# Patient Record
Sex: FEMALE | Race: WHITE | URBAN - METROPOLITAN AREA
[De-identification: names, ages, dates, MRNs, and addresses within clinical notes are randomized per-mention and may not be internally consistent; named-entity substitution may affect disease eponyms.]

---

## 2017-03-23 PROBLEM — M05.79 RHEUMATOID ARTHRITIS INVOLVING MULTIPLE SITES WITH POSITIVE RHEUMATOID FACTOR (HCC): Status: ACTIVE | Noted: 2017-03-23

## 2020-10-20 PROBLEM — Z53.20 COLONOSCOPY REFUSED: Status: ACTIVE | Noted: 2020-10-20

## 2020-10-20 PROBLEM — Z53.20 MAMMOGRAM DECLINED: Status: ACTIVE | Noted: 2020-10-20

## 2022-03-19 PROBLEM — M05.79 RHEUMATOID ARTHRITIS INVOLVING MULTIPLE SITES WITH POSITIVE RHEUMATOID FACTOR (HCC): Status: ACTIVE | Noted: 2017-03-23

## 2022-07-26 ENCOUNTER — HOSPITAL ENCOUNTER (OUTPATIENT)
Dept: CT IMAGING | Age: 72
Discharge: HOME | End: 2022-07-26
Attending: FAMILY MEDICINE
Payer: MEDICARE

## 2022-07-26 DIAGNOSIS — R20.2 PARESTHESIA: ICD-10-CM

## 2022-07-26 PROCEDURE — 70450 CT HEAD/BRAIN W/O DYE: CPT

## 2022-12-12 ENCOUNTER — OFFICE VISIT (OUTPATIENT)
Dept: NEUROLOGY | Age: 72
End: 2022-12-12
Payer: MEDICARE

## 2022-12-12 VITALS
WEIGHT: 112.1 LBS | RESPIRATION RATE: 16 BRPM | SYSTOLIC BLOOD PRESSURE: 146 MMHG | HEART RATE: 76 BPM | HEIGHT: 62 IN | OXYGEN SATURATION: 97 % | DIASTOLIC BLOOD PRESSURE: 70 MMHG | BODY MASS INDEX: 20.63 KG/M2

## 2022-12-12 DIAGNOSIS — R20.2 POSITIVE TINEL'S SIGN: ICD-10-CM

## 2022-12-12 DIAGNOSIS — R20.2 NUMBNESS AND TINGLING IN BOTH HANDS: ICD-10-CM

## 2022-12-12 DIAGNOSIS — R20.0 NUMBNESS AND TINGLING IN BOTH HANDS: ICD-10-CM

## 2022-12-12 DIAGNOSIS — R20.2 TINGLING OF BOTH FEET: Primary | ICD-10-CM

## 2022-12-12 PROCEDURE — G9899 SCRN MAM PERF RSLTS DOC: HCPCS | Performed by: PSYCHIATRY & NEUROLOGY

## 2022-12-12 PROCEDURE — 3017F COLORECTAL CA SCREEN DOC REV: CPT | Performed by: PSYCHIATRY & NEUROLOGY

## 2022-12-12 PROCEDURE — 1101F PT FALLS ASSESS-DOCD LE1/YR: CPT | Performed by: PSYCHIATRY & NEUROLOGY

## 2022-12-12 PROCEDURE — G8399 PT W/DXA RESULTS DOCUMENT: HCPCS | Performed by: PSYCHIATRY & NEUROLOGY

## 2022-12-12 PROCEDURE — 1123F ACP DISCUSS/DSCN MKR DOCD: CPT | Performed by: PSYCHIATRY & NEUROLOGY

## 2022-12-12 PROCEDURE — 1090F PRES/ABSN URINE INCON ASSESS: CPT | Performed by: PSYCHIATRY & NEUROLOGY

## 2022-12-12 PROCEDURE — 99204 OFFICE O/P NEW MOD 45 MIN: CPT | Performed by: PSYCHIATRY & NEUROLOGY

## 2022-12-12 PROCEDURE — G8427 DOCREV CUR MEDS BY ELIG CLIN: HCPCS | Performed by: PSYCHIATRY & NEUROLOGY

## 2022-12-12 PROCEDURE — G8510 SCR DEP NEG, NO PLAN REQD: HCPCS | Performed by: PSYCHIATRY & NEUROLOGY

## 2022-12-12 PROCEDURE — G8536 NO DOC ELDER MAL SCRN: HCPCS | Performed by: PSYCHIATRY & NEUROLOGY

## 2022-12-12 PROCEDURE — G8420 CALC BMI NORM PARAMETERS: HCPCS | Performed by: PSYCHIATRY & NEUROLOGY

## 2022-12-12 NOTE — PROGRESS NOTES
[facility] and [address] at [facility] Neurology Clinics at [facility] [address], [address] [facility] 7687 [address] [facility], [address] [place] [address]  [phone] Office  [phone]           Referring: Frank Chan MD  [address] [facility],  [id number]     Chief Complaint   Patient presents with    New Patient     Paresthesias intermittently in hands and feet. States they have gotten better but wanted to keep appt in case sensations came back     61-year-old lady who since today for initial neurologic consultation regarding dysesthesias in her hands and her feet. She first noticed this around February of this year and it continued through the spring and she saw her primary physician in July. Symptoms would come and go and she was starting to have an increase incidence of such so she had a CT of her head performed that was unremarkable. I did review that CT scan personally dated 7/26/2022 and to my interpretation I concur is normal.  In any regard she describes in her hands a tingling type sensation. No pins-and-needles. She feels like she may have Aubagio going on. She says that her hand and fingers felt swollen. All fingers involved. No weakness. Not dropping things. No difficulty opening bottles or cans. In her feet she notes that her toes with tingle and feel fat. She would sometimes feel like she had a drop of water on her leg and she would look to see if something was leaking or dripping on her and it was not. She also in the left gastroc will sometimes have the sensation that something is moving around in there. She is describing fasciculation. She has not had chemotherapy. No toxic exposures. She does not have diabetes. She believes her father had peripheral neuropathy and she believes it was likely idiopathic as he did not have diabetes. She has not had an EMG.     Rheumatology notes were forwarded kindly and are reviewed today. Office visit October 12, 2022 where the patient was following up. She was complaining of a 6 months of buzzing in her hands and feet. No numbness. Worse at rest.  Noted to have had a normal head CT. No joint swelling. No joint pain or stiffness. Examination was unremarkable  She was noted to have rheumatoid arthritis in remission without evidence of disease and resolved neck pain. She was noted to have this appointment today for her paresthesias and a 1 year revisit was planned. Recent laboratory analysis from July 18, 2022  CBC unremarkable  Hemoglobin A1c 5.2  Lipid panel with an LDL of 849  Metabolic panel normal  TSH normal  Free T4 normal  Vitamin D 49.5  Vitamin B 12 normal at 1222  Folate normal    Past Medical History:   Diagnosis Date    ADD (attention deficit disorder with hyperactivity) 3/26/2013    ADD (attention deficit disorder)     Onychomycosis     Osteopenia     Osteopenia 10/5/2015    Ulcerative colitis ([address] 10/3/2011    Zoster 1983       Past Surgical History:   Procedure Laterality Date    ENDOSCOPY, COLON, DIAGNOSTIC  10/11/2011    colonic polypectomy    ENDOSCOPY, COLON, DIAGNOSTIC  06/27/2012    mild Ulcerative Colitis, f/u 1 y ; dr. Rafael Mcclelland    HX COLONOSCOPY  11/06/2014    polypectomy, minimal Ulcerative Colitis , f/u 1 y; Dr. Rocio Davis W/ BS&O  1995    hysterectomy and b/l oophorectomy       Current Outpatient Medications   Medication Sig Dispense Refill    balsalazide 1.1 gram tab Take 1 Cap by mouth three (3) times daily. 100 Tab 11    multivitamin, stress formula (STRESS TAB) tablet Take 1 Tab by mouth daily. calcium-vitamin D 600 mg-5 mcg (200 unit) tab Take 1 Tab by mouth two (2) times daily (with meals).           Allergies   Allergen Reactions    Pcn [Penicillins] Rash       Social History     Tobacco Use    Smoking status: Never    Smokeless tobacco: Never   Substance Use Topics    Alcohol use: Yes     Alcohol/week: 1.0 - 2.0 standard drink     Types: 1 - 2 Glasses of wine per week     Comment: 4 drinks/week    Drug use: Never       Family History   Problem Relation Age of Onset    Heart Disease Mother         bypass surgery    Neuropathy Father         peripheral    Cancer Father         bladder     Review of Systems  Pertinent positives and negatives as noted. Examination  Visit Vitals  BP (!) 146/70 (BP 1 Location: Right arm, BP Patient Position: Sitting, BP Cuff Size: Adult)   Pulse 76   Resp 16   Ht 5' 2\" (1.575 m)   Wt 50.8 kg (112 lb 1.6 oz)   SpO2 97%   BMI 20.50 kg/m²     Neurologically, she is awake, alert, and oriented with normal speech and language. Her cognition is normal.    Intact cranial nerves 2-12. No nystagmus. She has normal bulk and tone. She has no abnormal movement. She has no pronation or drift. She generates full strength in the upper and lower extremities to direct confrontational testing. Reflexes are symmetrical in the upper and lower extremities bilaterally. No pathologic reflexes are elicited. Finger nose finger and rapid alternating movements are normal.  Sensory examination is intact to light touch pinprick and vibration. She has Tinel sign present at the left wrist not on the right. Impression/Plan  43-year-old lady with dysesthesias in the hands and feet and examination demonstrating left-sided Tinel's question bilateral carpal tunnel  Question neuropathy  Question other  EMG of the upper and lower extremities  Follow-up after    Augie Greene MD          This note was created using voice recognition software. Despite editing, there may be syntax errors.

## 2022-12-12 NOTE — PATIENT INSTRUCTIONS
[facility] Test Result Communication    Test results are available in [address] datapine is the patient portal into our electronic health record. This feature allows patients to see diagnostic test results, immunizations, allergies, past medical and surgical history, current medications, and send messages directly to providers. Our team members at the  can provide additional information and assist with registration. The datapine support team can be reached at [phone]. In some cases, a provider might need time to explain the results in detail during a follow-up appointment. This might include additional information or context that will help patients understand the reason for next steps in the plan of care recommended by their provider. If a patient chooses to receive diagnostic testing at an imaging center outside of the [facility], it is the patient's responsibility to bring the imaging report and disc to their [address] [place] [address] follow-up appointment. If the test results reveal anything that is particularly noteworthy, we will contact you to discuss the matter and, if necessary, schedule a follow-up appointment at an earlier date. If you have not received your test results by datapine or other communication within 7 days, please contact our office. An inquiry can be sent to your provider using datapine. Alternatively, appointments can be scheduled via telephone to review results. If a follow-up appointment to review results has not been scheduled, [address] office can be reached at [phone]. For appointments at our [facility] or [facility] office, please call [phone].        [facility] Neurology [facility]   Statement to Patients  April 1, 2014      In an effort to ensure the large volume of patient prescription refills is processed in the most efficient and expeditious manner, we are asking our patients to assist us by calling your Pharmacy for all prescription refills, this will include also your  Mail Order Pharmacy. The pharmacy will contact our office electronically to continue the refill process. Please do not wait until the last minute to call your pharmacy. We need at least 48 hours (2days) to fill prescriptions. We also encourage you to call your pharmacy before going to  your prescription to make sure it is ready. With regard to controlled substance prescription refill requests (narcotic refills) that need to be picked up at our office, we ask your cooperation by providing us with at least 72 hours (3days) notice that you will need a refill. We will not refill narcotic prescription refill requests after 4:00pm on any weekday, Monday through Thursday, or after 2:00pm on Fridays, or on the weekends. We encourage everyone to explore another way of getting your prescription refill request processed using easy2map, our patient web portal through our electronic medical record system. easy2map is an efficient and effective way to communicate your medication request directly to the office and  downloadable as an priya on your smart phone . easy2[employer] also features a review functionality that allows you to view your medication list as well as leave messages for your physician. Are you ready to get connected? If so please review the attatched instructions or speak to any of our staff to get you set up right away! Thank you so much for your cooperation. Should you have any questions please contact our Practice Administrator.

## 2023-01-26 ENCOUNTER — OFFICE VISIT (OUTPATIENT)
Dept: NEUROLOGY | Age: 73
End: 2023-01-26
Payer: MEDICARE

## 2023-01-26 DIAGNOSIS — R20.0 NUMBNESS AND TINGLING IN BOTH HANDS: Primary | ICD-10-CM

## 2023-01-26 DIAGNOSIS — R20.2 NUMBNESS AND TINGLING IN BOTH HANDS: Primary | ICD-10-CM

## 2023-01-26 PROCEDURE — 95886 MUSC TEST DONE W/N TEST COMP: CPT | Performed by: PSYCHIATRY & NEUROLOGY

## 2023-01-26 PROCEDURE — 95913 NRV CNDJ TEST 13/> STUDIES: CPT | Performed by: PSYCHIATRY & NEUROLOGY

## 2023-01-26 NOTE — PROCEDURES
EMG/ NCS Report  DR[facility] REHABILITATION  - [facility]  [address] [place] [address], [address] [place] [address]   [address]   Ph: [phone]   FAX: [phone]/ [phone]  Test Date:  2019      Test Date:  2023    Patient: Ismael Sanderson : 1950 Physician: Fan Saenz MD   Sex: Female Height: ' \" Ref Phys: Grecia Maier MD   ID#: [id number] Weight:  lbs. Technician: Rhys Schirmer     Patient History / Exam:  Patient comes in with several months of intermittent numbness of both hands and feet. (-) neck pain (-) weakness. Exam: motor and sensory intact. Patient is coming for neuropathy evaluation. EMG & NCV Findings:  Evaluation of the left median motor and the right median motor nerves showed normal distal onset latency (L2.9, R3.1 ms), normal amplitude (L10.6, R7.4 mV), and normal conduction velocity (Elbow-Wrist, L51, R53 m/s). The left ulnar motor and the right ulnar motor nerves showed normal distal onset latency (L2.9, R2.8 ms), normal amplitude (L12.9, R12.2 mV), normal conduction velocity (B Elbow-Wrist, L64, R63 m/s), and normal conduction velocity (A Elbow-B Elbow, L59, R67 m/s). The left median sensory, the right median sensory, the left radial sensory, the right radial sensory, the left ulnar sensory, and the right ulnar sensory nerves showed normal distal peak latency (L3.3, R3.2, L2.4, R2.0, L3.2, R3.1 ms) and normal amplitude (L82.8, R35.4, L89.5, R104.4, L65.6, R46.6 µV). The left median/ulnar (palm) comparison and the right median/ulnar (palm) comparison nerves showed normal distal onset latency (Median Palm, L1.4, R1.3 ms), normal distal peak latency (Median Palm, L1.8, R1.9 ms), normal amplitude (Median Palm, L76.8, R65.5 µV), normal distal onset latency (Ulnar Palm, L1.2, R1.4 ms), normal distal peak latency (Ulnar Palm, L1.7, R1.9 ms), and normal amplitude (Ulnar Palm, L61.4, R25.8 µV).   All left vs. right side differences were within normal limits. All F Wave latencies were within normal limits. All F Wave left vs. right side latency differences were within normal limits. All examined muscles (as indicated in the following table) showed no evidence of electrical instability. Impression:      Extensive electrodiagnostic examination of the right and left upper extremities, including palmar studies, is normal.    Specifically, there is no evidence of a peripheral neuropathy or cervical motor radiculopathy.       Christian Rosenberg MD  Diplomate, American Board of Psychiatry and Neurology  Diplomate, Neuromuscular Medicine  Diplomate, American Board of Electrodiagnostic Medicine  Director, [address] [place] [address] Accredited Laboratory with Exemplary Status            Nerve Conduction Studies  Anti Sensory Summary Table     Stim Site NR Peak (ms) Norm Peak (ms) P-T Amp (µV) Norm P-T Amp Site1 Site2 Dist (cm)   Left Median Anti Sensory (2nd Digit)  29.8 °C   Wrist    3.3 <4 82.8 >13 Wrist 2nd Digit 14.0   Right Median Anti Sensory (2nd Digit)  32.6 °C   Wrist    3.2 <4 35.4 >13 Wrist 2nd Digit 14.0   Left Radial Anti Sensory (Base 1st Digit)  30.7 °C   Wrist    2.4 <2.8 89.5 >11 Wrist Base 1st Digit 10.0   Right Radial Anti Sensory (Base 1st Digit)  32.7 °C   Wrist    2.0 <2.8 104.4 >11 Wrist Base 1st Digit 10.0   Left Ulnar Anti Sensory (5th Digit)  30.5 °C   Wrist    3.2 <4.0 65.6 >9 Wrist 5th Digit 14.0   Right Ulnar Anti Sensory (5th Digit)  32.7 °C   Wrist    3.1 <4.0 46.6 >9 Wrist 5th Digit 14.0     Motor Summary Table     Stim Site NR Onset (ms) Norm Onset (ms) O-P Amp (mV) Norm O-P Amp Amp (Prev) (%) Site1 Site2 Dist (cm) Bret (m/s) Norm Bret (m/s)   Left Median Motor (Abd Poll Brev)  30.8 °C   Wrist    2.9 <4.5 10.6 >4.1 100.0 Wrist Abd Poll Brev 8.0     Elbow    6.3  8.5  80.2 Elbow Wrist 17.5 51 >49   Right Median Motor (Abd Poll Brev)  32.7 °C   Wrist    3.1 <4.5 7.4 >4.1 100.0 Wrist Abd Poll Brev 8.0     Elbow    6.3  6.8  91.9 Elbow Wrist 17.0 53 >49   Left Ulnar Motor (Abd Dig Minimi)  30.6 °C   Wrist    2.9 <3.1 12.9 >7.0 100.0 Wrist Abd Dig Minimi 8.0     B Elbow    5.7  11.1  86.0 B Elbow Wrist 18.0 64 >50   A Elbow    7.4  9.1  82.0 A Elbow B Elbow 10.0 59 >50   Right Ulnar Motor (Abd Dig Minimi)  32.4 °C   Wrist    2.8 <3.1 12.2 >7.0 100.0 Wrist Abd Dig Minimi 8.0     B Elbow    5.5  11.6  95.1 B Elbow Wrist 17.0 63 >50   A Elbow    7.0  11.5  99.1 A Elbow B Elbow 10.0 67 >50     Comparison Summary Table     Stim Site NR Peak (ms) P-T Amp (µV) Site1 Site2 Dist (cm) Delta-0 (ms)   Left Median/Ulnar Palm Comparison (Wrist)  30.7 °C   Median Palm    1.8 90.9 Median Palm Ulnar Palm 8.0 0.2   Ulnar Palm    1.7 73.4       Right Median/Ulnar Palm Comparison (Wrist)  32.4 °C   Median Palm    1.9 67.9 Median Palm Ulnar Palm 8.0 0.1   Ulnar Palm    1.9 20.3         F Wave Studies     NR F-Lat (ms) Lat Norm (ms) L-R F-Lat (ms) L-R Lat Norm   Left Ulnar (Mrkrs) (Abd Dig Min)  30.3 °C      24.68 <32 0.41 <2.5   Right Ulnar (Mrkrs) (Abd Dig Min)  32.3 °C      24.27 <32 0.41 <2.5     EMG     Side Muscle Nerve Root Ins Act Fibs Psw Recrt Duration Amp Poly Comment   Left 1stDorInt Ulnar C8-T1 Nml Nml Nml Nml Nml Nml Nml    Left ExtIndicis Radial (Post Int) C7-8 Nml Nml Nml Nml Nml Nml Nml    Left Abd Poll Brev Median C8-T1 Nml Nml Nml Nml Nml Nml Nml    Left Biceps Musculocut C5-6 Nml Nml Nml Nml Nml Nml Nml    Left Triceps Radial C6-7-8 Nml Nml Nml Nml Nml Nml Nml    Left Deltoid Axillary C5-6 Nml Nml Nml Nml Nml Nml Nml    Right 1stDorInt Ulnar C8-T1 Nml Nml Nml Nml Nml Nml Nml    Right ExtIndicis Radial (Post Int) C7-8 Nml Nml Nml Nml Nml Nml Nml    Right Abd Poll Brev Median C8-T1 Nml Nml Nml Nml Nml Nml Nml    Right Biceps Musculocut C5-6 Nml Nml Nml Nml Nml Nml Nml    Right Triceps Radial C6-7-8 Nml Nml Nml Nml Nml Nml Nml    Right Deltoid Axillary C5-6 Nml Nml Nml Nml Nml Nml Nml    Right Lower Cerv Parasp Rami C7,T1 Nml Nml Nml Nml Nml Nml Nml Nerve Conduction Studies  Anti Sensory Left/Right Comparison     Stim Site L Lat (ms) R Lat (ms) L-R Lat (ms) L Amp (µV) R Amp (µV) L-R Amp (%) Site1 Site2 L Bret (m/s) R Bret (m/s) L-R Bret (m/s)   Median Anti Sensory (2nd Digit)  29.8 °C   Wrist 2.3 2.5 0.2 82.8 35.4 57.2 Wrist 2nd Digit 61 56 5   Radial Anti Sensory (Base 1st Digit)  30.7 °C   Wrist 1.9 1.5 0.4 89.5 104.4 14.3 Wrist Base 1st Digit 53 67 14   Ulnar Anti Sensory (5th Digit)  30.5 °C   Wrist 2.1 2.2 0.1 65.6 46.6 29.0 Wrist 5th Digit 67 64 3     Motor Left/Right Comparison     Stim Site L Lat (ms) R Lat (ms) L-R Lat (ms) L Amp (mV) R Amp (mV) L-R Amp (%) Site1 Site2 L Bret (m/s) R Bret (m/s) L-R Bret (m/s)   Median Motor (Abd Poll Brev)  30.8 °C   Wrist 2.9 3.1 0.2 10.6 7.4 30.2 Wrist Abd Poll Brev      Elbow 6.3 6.3 0.0 8.5 6.8 20.0 Elbow Wrist 51 53 2   Ulnar Motor (Abd Dig Minimi)  30.6 °C   Wrist 2.9 2.8 0.1 12.9 12.2 5.4 Wrist Abd Dig Minimi      B Elbow 5.7 5.5 0.2 11.1 11.6 4.3 B Elbow Wrist 64 63 1   A Elbow 7.4 7.0 0.4 9.1 11.5 20.9 A Elbow B Elbow 59 67 8     Comparison Left/Right Comparison     Stim Site L Lat (ms) R Lat (ms) L-R Lat (ms) L Amp (µV) R Amp (µV) L-R Amp (%)   Median/Ulnar Palm Comparison (Wrist)  30.7 °C   Median Palm 1.4 1.3 0.1 76.8 65.5 14.7   Ulnar Palm 1.2 1.4 0.2 61.4 25.8 58.0         Waveforms:

## 2023-01-27 ENCOUNTER — OFFICE VISIT (OUTPATIENT)
Dept: NEUROLOGY | Age: 73
End: 2023-01-27
Payer: MEDICARE

## 2023-01-27 DIAGNOSIS — R20.2 TINGLING OF BOTH FEET: Primary | ICD-10-CM

## 2023-01-27 NOTE — PROCEDURES
EMG/ NCS Report  [facility] REHABILITATION  - [facility]  [facility]  [facility], [address] [place] [address] [address], [address]   Ph: [phone]   FAX: [phone]  Test Date:  2019      Test Date:  2023    Patient: Jamee Herman : 1950 Physician: Maite Jessica MD   Sex: Female Height: ' \" Ref Phys: Cora Hercules MD   ID#: [id number] Weight:  lbs. Technician: Lorie Patterson     Patient History / Exam:  Patient comes in with several months of intermittent numbness of both hands and feet. (-) weakness (-) lower back pain    Patient is coming for neuropathy evaluation    Exam: Awake, alert, follows commands, motor 5/5, gait stable      EMG & NCV Findings:  Evaluation of the left Fibular motor and the right Fibular motor nerves showed normal distal onset latency (L3.9, R4.0 ms), normal amplitude (L6.3, R6.5 mV), normal conduction velocity (B Fib-Ankle, L53, R53 m/s), and normal conduction velocity (Poplt-B Fib, L59, R63 m/s). The left tibial motor and the right tibial motor nerves showed normal distal onset latency (L4.4, R3.4 ms), normal amplitude (L8.9, R13.1 mV), and normal conduction velocity (Knee-Ankle, L62, R46 m/s). The left Sup Fibular sensory, the right Sup Fibular sensory, the left sural sensory, and the right sural sensory nerves showed normal distal peak latency (L2.2, R2.3, L3.5, R3.2 ms) and normal amplitude (L15.2, R15.3, L6.7, R10.0 µV). All left vs. right side differences were within normal limits. All F Wave latencies were within normal limits. All F Wave left vs. right side latency differences were within normal limits. All H Reflex left vs. right side latency differences were within normal limits. All examined muscles (as indicated in the following table) showed no evidence of electrical instability.         Impression:      Extensive electrodiagnostic examination of the right and left lower extremities is normal.    Specifically, there is no evidence of a peripheral neuropathy or lumbosacral motor radiculopathy.         Roxy Cutler MD  Diplomate, American Board of Psychiatry and Neurology  Diplomate, Neuromuscular Medicine  Diplomate, American Board of Electrodiagnostic Medicine  Director, [address] [place] [address] Accredited Laboratory with Exemplary Status            Nerve Conduction Studies  Anti Sensory Summary Table     Stim Site NR Peak (ms) Norm Peak (ms) P-T Amp (µV) Norm P-T Amp Site1 Site2 Dist (cm)   Left Sup Fibular Anti Sensory (Lat ankle)  31.3 °C   Lower leg    2.2 <4.6 15.2 >4 Lower leg Lat ankle 10.0   Right Sup Fibular Anti Sensory (Lat ankle)  30.9 °C   Lower leg    2.3 <4.6 15.3 >4 Lower leg Lat ankle 10.0   Left Sural Anti Sensory (Lat Mall)  31 °C   Calf    3.5 <4.5 6.7 >4.0 Calf Lat Mall 14.0   Right Sural Anti Sensory (Lat Mall)  30.5 °C   Calf    3.2 <4.5 10.0 >4.0 Calf Lat Mall 14.0     Motor Summary Table     Stim Site NR Onset (ms) Norm Onset (ms) O-P Amp (mV) Norm O-P Amp Amp (Prev) (%) Site1 Site2 Dist (cm) Bret (m/s) Norm Bret (m/s)   Left Fibular Motor (Ext Dig Brev)  31.1 °C   Ankle    3.9 <6.5 6.3 >1.1 100.0 Ankle Ext Dig Brev 8.0     B Fib    9.0  5.4  85.7 B Fib Ankle 27.0 53 >38   Poplt    10.7  5.4  100.0 Poplt B Fib 10.0 59 >42   Right Fibular Motor (Ext Dig Brev)  31.2 °C   Ankle    4.0 <6.5 6.5 >1.1 100.0 Ankle Ext Dig Brev 8.0     B Fib    9.1  6.2  95.4 B Fib Ankle 27.0 53 >38   Poplt    10.7  6.0  96.8 Poplt B Fib 10.0 63 >42   Left Tibial Motor (Abd Greene Brev)  31.1 °C   Ankle    4.4 <6.1 8.9 >1.1 100.0 Ankle Abd Greene Brev 8.0     Knee    9.6  5.8  65.2 Knee Ankle 32.0 62 >39   Right Tibial Motor (Abd Greene Brev)  31.2 °C   Ankle    3.4 <6.1 13.1 >1.1 100.0 Ankle Abd Greene Brev 8.0     Knee    10.4  8.9  67.9 Knee Ankle 32.0 46 >39     F Wave Studies     NR F-Lat (ms) Lat Norm (ms) L-R F-Lat (ms) L-R Lat Norm   Left Tibial (Mrkrs) (Abd Hallucis)  31.1 °C      42.22 <56 1.52 <5.7   Right Tibial (Mrkrs) (Abd Hallucis)  31.1 °C 43.74 <56 1.52 <5.7     H Reflex Studies     NR H-Lat (ms) L-R H-Lat (ms) L-R Lat Norm   Left Tibial (Gastroc)  31.3 °C      27.75 1.88 <2.0   Right Tibial (Gastroc)  31 °C      29.62 1.88 <2.0     EMG     Side Muscle Nerve Root Ins Act Fibs Psw Recrt Duration Amp Poly Comment   Left Ext Dig Brev Dp Br Peron L5, S1 Nml Nml Nml Nml Nml Nml Nml    Left AbdHallucis MedPlantar S1-2 Nml Nml Nml Nml Nml Nml Nml    Left AntTibialis Dp Br Peron L4-5 Nml Nml Nml Nml Nml Nml Nml    Left MedGastroc Tibial S1-2 Nml Nml Nml Nml Nml Nml Nml    Left VastusLat Femoral L2-4 Nml Nml Nml Nml Nml Nml Nml    Right Ext Dig Brev Dp Br Peron L5, S1 Nml Nml Nml Nml Nml Nml Nml    Right AbdHallucis MedPlantar S1-2 Nml Nml Nml Nml Nml Nml Nml    Right AntTibialis Dp Br Peron L4-5 Nml Nml Nml Nml Nml Nml Nml    Right MedGastroc Tibial S1-2 Nml Nml Nml Nml Nml Nml Nml    Right VastusLat Femoral L2-4 Nml Nml Nml Nml Nml Nml Nml    Right GluteusMed SupGluteal L4-S1 Nml Nml Nml Nml Nml Nml Nml    Right Lower Lumb Parasp Rami L5,S1 Nml Nml Nml Nml Nml Nml Nml                Nerve Conduction Studies  Anti Sensory Left/Right Comparison     Stim Site L Lat (ms) R Lat (ms) L-R Lat (ms) L Amp (µV) R Amp (µV) L-R Amp (%) Site1 Site2 L Bret (m/s) R Bret (m/s) L-R Bret (m/s)   Sup Fibular Anti Sensory (Lat ankle)  31.3 °C   Lower leg 1.8 1.8 0.0 15.2 15.3 0.7 Lower leg Lat ankle 56 56 0   Sural Anti Sensory (Lat Mall)  31 °C   Calf 2.9 2.8 0.1 6.7 10.0 33.0 Calf Lat Mall 48 50 2     Motor Left/Right Comparison     Stim Site L Lat (ms) R Lat (ms) L-R Lat (ms) L Amp (mV) R Amp (mV) L-R Amp (%) Site1 Site2 L Bret (m/s) R Bret (m/s) L-R Bret (m/s)   Fibular Motor (Ext Dig Brev)  31.1 °C   Ankle 3.9 4.0 0.1 6.3 6.5 3.1 Ankle Ext Dig Brev      B Fib 9.0 9.1 0.1 5.4 6.2 12.9 B Fib Ankle 53 53 0   Poplt 10.7 10.7 0.0 5.4 6.0 10.0 Poplt B Fib 59 63 4   Tibial Motor (Abd Greene Brev)  31.1 °C   Ankle 4.4 3.4 1.0 8.9 13.1 32.1 Ankle Abd Greene Brev      Knee 9.6 10.4 0.8 5.8 8.9 34.8 Knee Ankle 62 46 16         Waveforms:

## 2023-01-30 ENCOUNTER — OFFICE VISIT (OUTPATIENT)
Dept: NEUROLOGY | Age: 73
End: 2023-01-30
Payer: MEDICARE

## 2023-01-30 VITALS
SYSTOLIC BLOOD PRESSURE: 110 MMHG | HEART RATE: 83 BPM | RESPIRATION RATE: 20 BRPM | DIASTOLIC BLOOD PRESSURE: 68 MMHG | OXYGEN SATURATION: 98 %

## 2023-01-30 DIAGNOSIS — R20.2 TINGLING OF BOTH FEET: ICD-10-CM

## 2023-01-30 DIAGNOSIS — R20.2 NUMBNESS AND TINGLING IN BOTH HANDS: Primary | ICD-10-CM

## 2023-01-30 DIAGNOSIS — R20.0 NUMBNESS AND TINGLING IN BOTH HANDS: Primary | ICD-10-CM

## 2023-01-30 PROCEDURE — G8427 DOCREV CUR MEDS BY ELIG CLIN: HCPCS

## 2023-01-30 PROCEDURE — 1090F PRES/ABSN URINE INCON ASSESS: CPT

## 2023-01-30 PROCEDURE — G8420 CALC BMI NORM PARAMETERS: HCPCS

## 2023-01-30 PROCEDURE — 1101F PT FALLS ASSESS-DOCD LE1/YR: CPT

## 2023-01-30 PROCEDURE — G8399 PT W/DXA RESULTS DOCUMENT: HCPCS

## 2023-01-30 PROCEDURE — 99213 OFFICE O/P EST LOW 20 MIN: CPT

## 2023-01-30 PROCEDURE — G8536 NO DOC ELDER MAL SCRN: HCPCS

## 2023-01-30 PROCEDURE — 3017F COLORECTAL CA SCREEN DOC REV: CPT

## 2023-01-30 PROCEDURE — G8432 DEP SCR NOT DOC, RNG: HCPCS

## 2023-01-30 PROCEDURE — 1123F ACP DISCUSS/DSCN MKR DOCD: CPT

## 2023-01-30 NOTE — PROGRESS NOTES
Moni White is a 81 y.o. female who presents with the following  Chief Complaint   Patient presents with    Follow-up     Test results- EMG        HPI  Patient is here today for EMG test results follow-up. Patient was last seen December 12, 2022 by Dr. Jose Marcos for intermittent numbness and tingling in her bilateral hands and feet that started around February 2022. Dr. Jose Marcos ordered an EMG of the upper and lower extremities that were both normal.  Reviewed the patient's labs done in July 2022 from her PCP and her B12 was 1222, and her vitamin D was 49.5. She states that she has not had any instances of the symptoms since early November 2022 and is feeling much better. Allergies   Allergen Reactions    Pcn [Penicillins] Rash       Current Outpatient Medications   Medication Sig    balsalazide 1.1 gram tab Take 1 Cap by mouth three (3) times daily. multivitamin, stress formula (STRESS TAB) tablet Take 1 Tab by mouth daily. calcium-vitamin D 600 mg-5 mcg (200 unit) tab Take 1 Tab by mouth two (2) times daily (with meals). No current facility-administered medications for this visit.        Social History     Tobacco Use   Smoking Status Never   Smokeless Tobacco Never       Past Medical History:   Diagnosis Date    ADD (attention deficit disorder with hyperactivity) 3/26/2013    ADD (attention deficit disorder)     Onychomycosis     Osteopenia     Osteopenia 10/5/2015    Ulcerative colitis ([facility] [address] 10/3/2011    Zoster 1983       Past Surgical History:   Procedure Laterality Date    ENDOSCOPY, COLON, DIAGNOSTIC  10/11/2011    colonic polypectomy    ENDOSCOPY, COLON, DIAGNOSTIC  06/27/2012    mild Ulcerative Colitis, f/u 1 y ; dr. Jacuqi Corral    HX COLONOSCOPY  11/06/2014    polypectomy, minimal Ulcerative Colitis , f/u 1 y; Dr. Brooke Jama BS&O  1995    hysterectomy and b/l oophorectomy       Family History   Problem Relation Age of Onset    Heart Disease Mother         bypass surgery    Neuropathy Father         peripheral    Cancer Father         bladder       Social History     Socioeconomic History    Marital status:    Tobacco Use    Smoking status: Never    Smokeless tobacco: Never   Substance and Sexual Activity    Alcohol use: Yes     Alcohol/week: 1.0 - 2.0 standard drink     Types: 1 - 2 Glasses of wine per week     Comment: 4 drinks/week    Drug use: Never    Sexual activity: Not Currently       Review of Systems   Constitutional: Negative. Remainder of comprehensive review is negative. Physical Exam :    Visit Vitals  /68 (BP 1 Location: Left upper arm, BP Patient Position: Sitting, BP Cuff Size: Adult)   Pulse 83   Resp 20   SpO2 98%       General: Well defined, nourished, and groomed individual in no acute distress. Neck: Supple, nontender, no bruits, no pain with resistance to active range of motion. Musculoskeletal: Extremities revealed no edema and had full range of motion of joints. Psych: Good mood and bright affect    NEUROLOGICAL EXAMINATION:    Mental Status: Alert and oriented to person, place, and time    Cranial Nerves:    II, III, IV, VI: Visual acuity grossly intact. Visual fields are normal.    Pupils are equal, round, and reactive to light and accommodation. Extra-ocular movements are full and fluid. Fundoscopic exam was benign, no ptosis or nystagmus. V-XII: Hearing is grossly intact. Facial features are symmetric, with normal sensation and strength. The palate rises symmetrically and the tongue protrudes midline. Sternocleidomastoids 5/5. Motor Examination: Normal tone, bulk, and strength, 5/5 muscle strength throughout. Coordination: Finger to nose was normal. No resting or intention tremor    Gait and Station: Steady while walking. Normal arm swing. No pronator drift. No muscle wasting or fasiculations noted. Reflexes: DTRs 2+ throughout.     Results for orders placed or performed in visit on 07/18/22   CBC WITH AUTOMATED DIFF   Result Value Ref Range    WBC 3.5 3.4 - 10.8 x10E3/uL    RBC 4.64 3.77 - 5.28 x10E6/uL    HGB 14.3 11.1 - 15.9 g/dL    HCT 43.5 34.0 - 46.6 %    MCV 94 79 - 97 fL    MCH 30.8 26.6 - 33.0 pg    MCHC 32.9 31.5 - 35.7 g/dL    RDW 12.8 11.7 - 15.4 %    PLATELET 817 794 - 208 x10E3/uL    NEUTROPHILS 51 Not Estab. %    Lymphocytes 39 Not Estab. %    MONOCYTES 10 Not Estab. %    EOSINOPHILS 0 Not Estab. %    BASOPHILS 0 Not Estab. %    ABS. NEUTROPHILS 1.8 1.4 - 7.0 x10E3/uL    Abs Lymphocytes 1.4 0.7 - 3.1 x10E3/uL    ABS. MONOCYTES 0.4 0.1 - 0.9 x10E3/uL    ABS. EOSINOPHILS 0.0 0.0 - 0.4 x10E3/uL    ABS. BASOPHILS 0.0 0.0 - 0.2 x10E3/uL    IMMATURE GRANULOCYTES 0 Not Estab. %    ABS. IMM. GRANS. 0.0 0.0 - 0.1 x10E3/uL   HEMOGLOBIN A1C W/O EAG   Result Value Ref Range    Hemoglobin A1c 5.2 4.8 - 5.6 %   LIPID PANEL   Result Value Ref Range    Cholesterol, total 197 100 - 199 mg/dL    Triglyceride 105 0 - 149 mg/dL    HDL Cholesterol 62 >39 mg/dL    VLDL, calculated 19 5 - 40 mg/dL    LDL, calculated 116 (H) 0 - 99 mg/dL   METABOLIC PANEL, COMPREHENSIVE   Result Value Ref Range    Glucose 88 65 - 99 mg/dL    BUN 11 8 - 27 mg/dL    Creatinine 0.68 0.57 - 1.00 mg/dL    eGFR 94 >59 mL/min/1.73    BUN/Creatinine ratio 16 12 - 28    Sodium 141 134 - 144 mmol/L    Potassium 4.1 3.5 - 5.2 mmol/L    Chloride 102 96 - 106 mmol/L    CO2 26 20 - 29 mmol/L    Calcium 9.6 8.7 - 10.3 mg/dL    Protein, total 6.9 6.0 - 8.5 g/dL    Albumin 4.6 3.8 - 4.8 g/dL    GLOBULIN, TOTAL 2.3 1.5 - 4.5 g/dL    A-G Ratio 2.0 1.2 - 2.2    Bilirubin, total 0.4 0.0 - 1.2 mg/dL    Alk.  phosphatase 75 44 - 121 IU/L    AST (SGOT) 26 0 - 40 IU/L    ALT (SGPT) 11 0 - 32 IU/L   T4, FREE   Result Value Ref Range    T4, Free 1.19 0.82 - 1.77 ng/dL   TSH 3RD GENERATION   Result Value Ref Range    TSH 2.690 0.450 - 4.500 uIU/mL   VITAMIN D, 25 HYDROXY   Result Value Ref Range    VITAMIN D, 25-HYDROXY 49.5 30.0 - 100.0 ng/mL   VITAMIN B12 & FOLATE   Result Value Ref Range    Vitamin B12 1,222 232 - 1,245 pg/mL    Folate >20.0 >3.0 ng/mL       No orders of the defined types were placed in this encounter. 1. Numbness and tingling in both hands    2. Tingling of both feet      Since the patient's EMG test results were normal, advised the patient that she could speak with her PCP to see if she would benefit from a little bit more vitamin D supplementation even though she is in the normal range, it could be better. Patient will follow-up with neurology on an as-needed basis. Follow-up and Dispositions    Return if symptoms worsen or fail to improve.                  This note will not be viewable in [facility]

## 2023-05-20 RX ORDER — B-COMPLEX WITH VITAMIN C
1 TABLET ORAL 2 TIMES DAILY WITH MEALS
COMMUNITY